# Patient Record
Sex: MALE | Race: WHITE | NOT HISPANIC OR LATINO | Employment: FULL TIME | URBAN - NONMETROPOLITAN AREA
[De-identification: names, ages, dates, MRNs, and addresses within clinical notes are randomized per-mention and may not be internally consistent; named-entity substitution may affect disease eponyms.]

---

## 2021-06-14 ENCOUNTER — OFFICE VISIT (OUTPATIENT)
Dept: NEUROLOGY | Facility: CLINIC | Age: 41
End: 2021-06-14

## 2021-06-14 VITALS
DIASTOLIC BLOOD PRESSURE: 64 MMHG | SYSTOLIC BLOOD PRESSURE: 112 MMHG | RESPIRATION RATE: 16 BRPM | OXYGEN SATURATION: 99 % | BODY MASS INDEX: 28.63 KG/M2 | HEART RATE: 88 BPM | WEIGHT: 200 LBS | HEIGHT: 70 IN

## 2021-06-14 DIAGNOSIS — G56.21 CUBITAL TUNNEL SYNDROME ON RIGHT: Primary | ICD-10-CM

## 2021-06-14 PROBLEM — M45.9 SPONDYLITIS, ANKYLOSING (HCC): Status: ACTIVE | Noted: 2021-06-14

## 2021-06-14 PROCEDURE — 99203 OFFICE O/P NEW LOW 30 MIN: CPT | Performed by: PHYSICIAN ASSISTANT

## 2021-06-14 RX ORDER — SECUKINUMAB 150 MG/ML
150 INJECTION SUBCUTANEOUS
COMMUNITY
Start: 2010-06-13

## 2021-06-14 RX ORDER — LEVOCETIRIZINE DIHYDROCHLORIDE 5 MG/1
5 TABLET, FILM COATED ORAL DAILY
COMMUNITY
Start: 2019-06-13

## 2021-06-14 RX ORDER — NYSTATIN 100000 [USP'U]/G
POWDER TOPICAL AS NEEDED
COMMUNITY
Start: 2021-06-04

## 2021-06-25 NOTE — PROGRESS NOTES
Garry Woodard is a 40 y.o. male is being seen for consultation today at the request of Sy Min MD    Right ulnar compression symptoms.  This is superimposed on a history of ankylosing spondylitis.  The patient has had a cervical spine MRI which is showing some mild degenerative changes with no significant compression and no compression by report that would extend will cause the symptoms described.  No significant weakness.       The following portions of the patient's history were reviewed and updated as appropriate: allergies, current medications, past family history, past medical history, past social history, past surgical history and problem list.    Review of Systems   Constitutional: Negative.    HENT: Positive for congestion and rhinorrhea.    Eyes: Negative.    Respiratory: Negative.    Cardiovascular: Negative.    Gastrointestinal: Negative.    Musculoskeletal: Positive for back pain and neck pain.   Skin: Negative.    Allergic/Immunologic: Positive for environmental allergies.   Neurological: Positive for numbness.         Current Outpatient Medications:   •  levocetirizine (Xyzal Allergy 24HR) 5 MG tablet, Take 5 mg by mouth Daily., Disp: , Rfl:   •  nystatin 099986 UNIT/GM powder, As Needed., Disp: , Rfl:   •  Secukinumab (Cosentyx) 150 MG/ML solution prefilled syringe, Inject 150 mg as directed Every 30 (Thirty) Days., Disp: , Rfl:      Objective   Physical Exam  Vitals and nursing note reviewed.   HENT:      Head: Normocephalic.      Right Ear: Hearing and external ear normal.      Left Ear: Hearing and external ear normal.      Nose: Nose normal.      Mouth/Throat:      Pharynx: Oropharynx is clear.   Eyes:      General: Lids are normal. Vision grossly intact. Gaze aligned appropriately. No scleral icterus.     Extraocular Movements: Extraocular movements intact.      Conjunctiva/sclera: Conjunctivae normal.      Pupils: Pupils are equal, round, and reactive to light.       Visual Fields: Right eye visual fields normal and left eye visual fields normal.   Neck:      Vascular: No carotid bruit or JVD.      Trachea: Trachea and phonation normal.   Cardiovascular:      Rate and Rhythm: Normal rate.      Heart sounds: Normal heart sounds.   Pulmonary:      Effort: Pulmonary effort is normal.      Breath sounds: Normal breath sounds.   Musculoskeletal:      Cervical back: Normal range of motion.   Skin:     General: Skin is warm and dry.   Neurological:      Mental Status: He is alert and oriented to person, place, and time.      GCS: GCS eye subscore is 4. GCS verbal subscore is 5. GCS motor subscore is 6.      Cranial Nerves: Cranial nerves are intact.      Sensory: Sensation is intact.      Motor: Motor function is intact.      Coordination: Coordination is intact.      Gait: Gait is intact.      Deep Tendon Reflexes: Reflexes are normal and symmetric.      Comments: Positive cubital tunnel sensory and compressive signs on the right near the elbow.   Psychiatric:         Attention and Perception: Attention and perception normal.         Mood and Affect: Mood and affect normal.         Speech: Speech normal.         Behavior: Behavior normal.         Thought Content: Thought content normal.         Cognition and Memory: Cognition and memory normal.         Judgment: Judgment normal.       SCANNED - IMAGING (01/06/2021)      Assessment/Plan   Diagnoses and all orders for this visit:    1. 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  The patient does not have significant atrophy or severe symptoms.  Conservative management of this issue is discussed also surgical referral and options are reviewed with the patient.  Patient expresses understanding and will return on a as needed basis.           Dictated utilizing Dragon dictation.